# Patient Record
(demographics unavailable — no encounter records)

---

## 2025-03-13 NOTE — ASSESSMENT
[FreeTextEntry1] : 29 y/o M with no significant PMHx or PSHx. He presented to [facility] on 3/7 with complaints of left sided chest pain and was found to have a pneumothorax. He was transferred to [facility] for further management. CT Chest showed no blebs and he remained on a non-rebreather. While admitted he was noted to be bradycardic, HR 37-50. EKG revealed NSR and he was asymptomatic. He was discharged home with outpatient thoracic follow-up.   He feels well and has no complaints except for some "bubbly" feeling on his left side. His CXR including images were reviewed and shows a small resolving PTX. His chest CT completed on 03/07/2025 was also reviewed including images which shows a possible INGRIS bleb? Given that his PTX was small and is resolving, no intervention is required. If he has a second episode requiring intervention, he should have a L VATS exploration. He will follow up with us in 4 weeks.  I recommended him to avoid travel with flight until next visit.   Plan: Follow up with us in 4 weeks with a CXR  I, Dr. Mason Hernandez MD, personally performed the evaluation and management (E/M) services for this established patient who presents today with (a) new problem(s)/exacerbation of (an) existing condition(s).  That E/M includes conducting the clinically appropriate interval history &/or exam, assessing all new/exacerbated conditions, and establishing a new plan of care. I have also independently reviewed the medical records and imaging at the time of this office visit, and discussed the above mentioned images with interpretations with the patient. Today, my DAISY Kelly Jiménez, was here to observe &/or participate in the visit & follow plan of care established by me.

## 2025-03-13 NOTE — HISTORY OF PRESENT ILLNESS
[FreeTextEntry1] : Patient is a 29 y/o M with no significant PMHx or PSHx. He presented to [facility] on 3/7 with complaints of left sided chest pain and was found to have a pneumothorax. He was transferred to [facility] for further management. CT Chest showed no blebs and he remained on a non-rebreather. While admitted he was noted to be bradycardic, HR 37-50. EKG revealed NSR and he was asymptomatic. He was discharged home with outpatient thoracic follow-up.   Patient reports doing well. Still feels 'bubbles' in left chest. Denies dizziness, palpitations, chest pain or SOB.   Prior imaging and workup as follows:  CXR on 03/13/2025 - small left apex pneumothorax, similar to prior exam 03/082025. No focal or acute infiltrate. No pleural effusion. Unremarkable cardiac silhouette.   CXR 03/8/25: No significant change left apical pneumothorax   CT Chest 3/7/25:  - Small to moderate left pneumothorax  - 1.6cm partially calcified right thyroid nodule, consider further evaluation with non-emergent ultrasound thyroid if clinically warranted.

## 2025-03-13 NOTE — PHYSICAL EXAM
[Fully active, able to carry on all pre-disease performance without restriction] : Status 0 - Fully active, able to carry on all pre-disease performance without restriction [Neck Appearance] : the appearance of the neck was normal [Neck Cervical Mass (___cm)] : no neck mass was observed [Jugular Venous Distention Increased] : there was no jugular-venous distention [Thyroid Diffuse Enlargement] : the thyroid was not enlarged [Thyroid Nodule] : there were no palpable thyroid nodules [] : no respiratory distress [Auscultation Breath Sounds / Voice Sounds] : lungs were clear to auscultation bilaterally [Heart Rate And Rhythm] : heart rate was normal and rhythm regular [Heart Sounds] : normal S1 and S2 [Heart Sounds Gallop] : no gallops [Murmurs] : no murmurs [Heart Sounds Pericardial Friction Rub] : no pericardial rub [Oriented To Time, Place, And Person] : oriented to person, place, and time [Impaired Insight] : insight and judgment were intact [Affect] : the affect was normal

## 2025-03-13 NOTE — ASSESSMENT
[FreeTextEntry1] : 31 y/o M with no significant PMHx or PSHx. He presented to [facility] on 3/7 with complaints of left sided chest pain and was found to have a pneumothorax. He was transferred to [facility] for further management. CT Chest showed no blebs and he remained on a non-rebreather. While admitted he was noted to be bradycardic, HR 37-50. EKG revealed NSR and he was asymptomatic. He was discharged home with outpatient thoracic follow-up.   He feels well and has no complaints except for some "bubbly" feeling on his left side. His CXR including images were reviewed and shows a small resolving PTX. His chest CT completed on 03/07/2025 was also reviewed including images which shows a possible INGRIS bleb? Given that his PTX was small and is resolving, no intervention is required. If he has a second episode requiring intervention, he should have a L VATS exploration. He will follow up with us in 4 weeks.  I recommended him to avoid travel with flight until next visit.   Plan: Follow up with us in 4 weeks with a CXR  I, Dr. Mason Hernandez MD, personally performed the evaluation and management (E/M) services for this established patient who presents today with (a) new problem(s)/exacerbation of (an) existing condition(s).  That E/M includes conducting the clinically appropriate interval history &/or exam, assessing all new/exacerbated conditions, and establishing a new plan of care. I have also independently reviewed the medical records and imaging at the time of this office visit, and discussed the above mentioned images with interpretations with the patient. Today, my DAISY Kelly Jiménez, was here to observe &/or participate in the visit & follow plan of care established by me.

## 2025-03-13 NOTE — ASSESSMENT
[FreeTextEntry1] : 29 y/o M with no significant PMHx or PSHx. He presented to [facility] on 3/7 with complaints of left sided chest pain and was found to have a pneumothorax. He was transferred to [facility] for further management. CT Chest showed no blebs and he remained on a non-rebreather. While admitted he was noted to be bradycardic, HR 37-50. EKG revealed NSR and he was asymptomatic. He was discharged home with outpatient thoracic follow-up.   He feels well and has no complaints except for some "bubbly" feeling on his left side. His CXR including images were reviewed and shows a small resolving PTX. His chest CT completed on 03/07/2025 was also reviewed including images which shows a possible INGRIS bleb? Given that his PTX was small and is resolving, no intervention is required. If he has a second episode requiring intervention, he should have a L VATS exploration. He will follow up with us in 4 weeks.  I recommended him to avoid travel with flight until next visit.   Plan: Follow up with us in 4 weeks with a CXR  I, Dr. Mason Hernandez MD, personally performed the evaluation and management (E/M) services for this established patient who presents today with (a) new problem(s)/exacerbation of (an) existing condition(s).  That E/M includes conducting the clinically appropriate interval history &/or exam, assessing all new/exacerbated conditions, and establishing a new plan of care. I have also independently reviewed the medical records and imaging at the time of this office visit, and discussed the above mentioned images with interpretations with the patient. Today, my DAISY Kelly Jiménez, was here to observe &/or participate in the visit & follow plan of care established by me.

## 2025-03-13 NOTE — HISTORY OF PRESENT ILLNESS
[FreeTextEntry1] : Patient is a 31 y/o M with no significant PMHx or PSHx. He presented to [facility] on 3/7 with complaints of left sided chest pain and was found to have a pneumothorax. He was transferred to [facility] for further management. CT Chest showed no blebs and he remained on a non-rebreather. While admitted he was noted to be bradycardic, HR 37-50. EKG revealed NSR and he was asymptomatic. He was discharged home with outpatient thoracic follow-up.   Patient reports doing well. Still feels 'bubbles' in left chest. Denies dizziness, palpitations, chest pain or SOB.   Prior imaging and workup as follows:  CXR on 03/13/2025 - small left apex pneumothorax, similar to prior exam 03/082025. No focal or acute infiltrate. No pleural effusion. Unremarkable cardiac silhouette.   CXR 03/8/25: No significant change left apical pneumothorax   CT Chest 3/7/25:  - Small to moderate left pneumothorax  - 1.6cm partially calcified right thyroid nodule, consider further evaluation with non-emergent ultrasound thyroid if clinically warranted.

## 2025-03-17 NOTE — HISTORY OF PRESENT ILLNESS
[FreeTextEntry1] : Patient is a 31 y/o M with no significant PMHx or PSHx. He presented to [facility] on 3/7 with complaints of left sided chest pain and was found to have a pneumothorax. He was transferred to [facility] for further management. CT Chest showed no blebs and he remained on a non-rebreather. He was discharged home with outpatient thoracic follow-up.  CXR during follow-up visit on 3/13/25 showed a small L apex ptx. He presents today for a follow-up visit with CXR to note change or resolution.  CXR 4/10/25: XX

## 2025-03-17 NOTE — DATA REVIEWED
[FreeTextEntry1] : CXR on 03/13/2025 - small left apex pneumothorax, similar to prior exam 03/082025. No focal or acute infiltrate. No pleural effusion. Unremarkable cardiac silhouette.  CXR 03/8/25: No significant change left apical pneumothorax  CT Chest 3/7/25: - Small to moderate left pneumothorax - 1.6cm partially calcified right thyroid nodule, consider further evaluation with non-emergent ultrasound thyroid if clinically warranted.

## 2025-03-18 NOTE — REASON FOR VISIT
[FreeTextEntry1] : 30M comes in for a visit. Patient was recently hospitalized for spontaneous pneumothorax. During hospitalization patient was noted to be bradycardic. Interestingly, he had a few syncopal episodes several years back.  No recent cardiac evaluation.  He is normally quite active. He is gradually returning to exercise.  No recent primary care visit.

## 2025-03-18 NOTE — DISCUSSION/SUMMARY
[FreeTextEntry1] : SB/syncope will check echocardiogram if able to approve and schedule. We discussed holter, but he wants to do that a little later in the year, when he had a chance to get back to working out regularly Will also bring back for labs for further cardiac risk stratification.

## 2025-04-10 NOTE — ASSESSMENT
[FreeTextEntry1] : 31 y/o M with no significant PMHx or PSHx. He presented to [facility] on 3/7 with complaints of left sided chest pain and was found to have a pneumothorax. He was transferred to [facility] for further management. CT Chest showed no blebs and he remained on a non-rebreather. He was discharged home with outpatient thoracic follow-up.  CXR during follow-up visit on 3/13/25 showed a small L apex ptx. He presents today for a follow-up visit with CXR to note change or resolution.  CXR 4/10/25: Stable, no large PTX seen  He feels well and has no complaints. His CXR including images was reviewed and shows resolution of his PTX. He was instructed to go to ER for a CXR is he has recurrent symptoms of chest pain and SOB on his right or left side. Otherwise, he will follow up PRN.  Plan: Follow up PRN  I, Dr. Mason Hernandez MD, personally performed the evaluation and management (E/M) services for this new patient.  That E/M includes conducting the clinically appropriate initial history &/or exam, assessing all conditions, and establishing the plan of care.  I have also independently reviewed the medical records and imaging at the time of this office visit, and discussed the above mentioned images with interpretations with the patient. Today, my DAISY was here to observe &/or participate in the visit & follow plan of care established by me.

## 2025-04-10 NOTE — PHYSICAL EXAM
[Neck Appearance] : the appearance of the neck was normal [Jugular Venous Distention Increased] : there was no jugular-venous distention [] : no respiratory distress [Respiration, Rhythm And Depth] : normal respiratory rhythm and effort [Heart Rate And Rhythm] : heart rate was normal and rhythm regular [Heart Sounds] : normal S1 and S2 [Examination Of The Chest] : the chest was normal in appearance [Chest Visual Inspection Thoracic Asymmetry] : no chest asymmetry [Sensation] : the sensory exam was normal to light touch and pinprick [Motor Exam] : the motor exam was normal [Oriented To Time, Place, And Person] : oriented to person, place, and time [Affect] : the affect was normal [Mood] : the mood was normal

## 2025-04-10 NOTE — HISTORY OF PRESENT ILLNESS
[FreeTextEntry1] : Patient is a 31 y/o M with no significant PMHx or PSHx. He presented to [facility] on 3/7 with complaints of left sided chest pain and was found to have a pneumothorax. He was transferred to [facility] for further management. CT Chest showed no blebs and he remained on a non-rebreather. He was discharged home with outpatient thoracic follow-up.  CXR during follow-up visit on 3/13/25 showed a small L apex ptx. He presents today for a follow-up visit with CXR to note change or resolution.  CXR 4/10/25: Stable, no large PTX seen

## 2025-04-10 NOTE — ASSESSMENT
[FreeTextEntry1] : 29 y/o M with no significant PMHx or PSHx. He presented to [facility] on 3/7 with complaints of left sided chest pain and was found to have a pneumothorax. He was transferred to [facility] for further management. CT Chest showed no blebs and he remained on a non-rebreather. He was discharged home with outpatient thoracic follow-up.  CXR during follow-up visit on 3/13/25 showed a small L apex ptx. He presents today for a follow-up visit with CXR to note change or resolution.  CXR 4/10/25: Stable, no large PTX seen  He feels well and has no complaints. His CXR including images was reviewed and shows resolution of his PTX. He was instructed to go to ER for a CXR is he has recurrent symptoms of chest pain and SOB on his right or left side. Otherwise, he will follow up PRN.  Plan: Follow up PRN  I, Dr. Mason Hernandez MD, personally performed the evaluation and management (E/M) services for this new patient.  That E/M includes conducting the clinically appropriate initial history &/or exam, assessing all conditions, and establishing the plan of care.  I have also independently reviewed the medical records and imaging at the time of this office visit, and discussed the above mentioned images with interpretations with the patient. Today, my DAISY was here to observe &/or participate in the visit & follow plan of care established by me.

## 2025-04-10 NOTE — HISTORY OF PRESENT ILLNESS
[FreeTextEntry1] : Patient is a 29 y/o M with no significant PMHx or PSHx. He presented to [facility] on 3/7 with complaints of left sided chest pain and was found to have a pneumothorax. He was transferred to [facility] for further management. CT Chest showed no blebs and he remained on a non-rebreather. He was discharged home with outpatient thoracic follow-up.  CXR during follow-up visit on 3/13/25 showed a small L apex ptx. He presents today for a follow-up visit with CXR to note change or resolution.  CXR 4/10/25: Stable, no large PTX seen